# Patient Record
Sex: FEMALE | Race: ASIAN | NOT HISPANIC OR LATINO | URBAN - METROPOLITAN AREA
[De-identification: names, ages, dates, MRNs, and addresses within clinical notes are randomized per-mention and may not be internally consistent; named-entity substitution may affect disease eponyms.]

---

## 2023-11-18 ENCOUNTER — OFFICE VISIT (OUTPATIENT)
Dept: URGENT CARE | Facility: CLINIC | Age: 43
End: 2023-11-18
Payer: COMMERCIAL

## 2023-11-18 VITALS
DIASTOLIC BLOOD PRESSURE: 79 MMHG | HEIGHT: 60 IN | SYSTOLIC BLOOD PRESSURE: 114 MMHG | WEIGHT: 148 LBS | OXYGEN SATURATION: 97 % | HEART RATE: 91 BPM | BODY MASS INDEX: 29.06 KG/M2

## 2023-11-18 DIAGNOSIS — N30.00 ACUTE CYSTITIS WITHOUT HEMATURIA: Primary | ICD-10-CM

## 2023-11-18 DIAGNOSIS — R30.0 DYSURIA: ICD-10-CM

## 2023-11-18 PROBLEM — G43.909 MIGRAINE HEADACHE: Status: ACTIVE | Noted: 2023-11-18

## 2023-11-18 PROBLEM — J32.1 CHRONIC FRONTAL SINUSITIS: Status: ACTIVE | Noted: 2020-10-27

## 2023-11-18 PROBLEM — K21.9 GASTROESOPHAGEAL REFLUX DISEASE: Status: ACTIVE | Noted: 2021-05-14

## 2023-11-18 PROBLEM — N39.0 UTI (URINARY TRACT INFECTION): Status: ACTIVE | Noted: 2023-11-18

## 2023-11-18 PROBLEM — R11.2 NAUSEA AND/OR VOMITING: Status: RESOLVED | Noted: 2023-11-18 | Resolved: 2023-11-18

## 2023-11-18 PROBLEM — M94.0 COSTOCHONDRITIS: Status: RESOLVED | Noted: 2020-10-27 | Resolved: 2023-11-18

## 2023-11-18 LAB
POC APPEARANCE, URINE: ABNORMAL
POC BILIRUBIN, URINE: NEGATIVE
POC BLOOD, URINE: ABNORMAL
POC COLOR, URINE: ABNORMAL
POC GLUCOSE, URINE: NEGATIVE MG/DL
POC KETONES, URINE: NEGATIVE MG/DL
POC LEUKOCYTES, URINE: ABNORMAL
POC NITRITE,URINE: NEGATIVE
POC PH, URINE: 6.5 PH
POC PROTEIN, URINE: ABNORMAL MG/DL
POC SPECIFIC GRAVITY, URINE: 1.01
POC UROBILINOGEN, URINE: 0.2 EU/DL

## 2023-11-18 PROCEDURE — 99213 OFFICE O/P EST LOW 20 MIN: CPT

## 2023-11-18 PROCEDURE — 87186 SC STD MICRODIL/AGAR DIL: CPT

## 2023-11-18 PROCEDURE — 87086 URINE CULTURE/COLONY COUNT: CPT

## 2023-11-18 PROCEDURE — 81003 URINALYSIS AUTO W/O SCOPE: CPT

## 2023-11-18 RX ORDER — NITROFURANTOIN 25; 75 MG/1; MG/1
100 CAPSULE ORAL 2 TIMES DAILY
Qty: 14 CAPSULE | Refills: 0 | Status: SHIPPED | OUTPATIENT
Start: 2023-11-18 | End: 2023-11-25

## 2023-11-18 ASSESSMENT — ENCOUNTER SYMPTOMS
SHORTNESS OF BREATH: 0
HEADACHES: 0
CHILLS: 0
ABDOMINAL PAIN: 1
FREQUENCY: 1
NEUROLOGICAL NEGATIVE: 1
VOMITING: 0
NAUSEA: 0
RESPIRATORY NEGATIVE: 1
COUGH: 0
HEMATURIA: 0
CONSTITUTIONAL NEGATIVE: 1
FEVER: 0
CARDIOVASCULAR NEGATIVE: 1
DIAPHORESIS: 0
PALPITATIONS: 0
DIZZINESS: 0
FLANK PAIN: 0
BACK PAIN: 1
DYSURIA: 1

## 2023-11-18 NOTE — PROGRESS NOTES
Subjective   Patient ID: Malachi Rosenbaum is a 43 y.o. female.      History provided by:  Spouse, medical records and patient  Difficulty Urinating  Pain quality:  Aching  Pain severity:  Severe  Onset quality:  Sudden  Duration:  4 days  Timing:  Constant  Progression:  Worsening  Chronicity:  New  Recent urinary tract infections: no    Relieved by:  Nothing  Ineffective treatments:  None tried  Urinary symptoms: foul-smelling urine and frequent urination    Urinary symptoms: no discolored urine    Associated symptoms: abdominal pain    Associated symptoms: no fever, no flank pain, no nausea and no vomiting        The following portions of the chart were reviewed this encounter and updated as appropriate:  Allergies  Meds  Problems         Review of Systems   Constitutional: Negative.  Negative for chills, diaphoresis and fever.   Respiratory: Negative.  Negative for cough and shortness of breath.    Cardiovascular: Negative.  Negative for chest pain and palpitations.   Gastrointestinal:  Positive for abdominal pain. Negative for nausea and vomiting.   Genitourinary:  Positive for dysuria, frequency, pelvic pain and urgency. Negative for flank pain and hematuria.   Musculoskeletal:  Positive for back pain.   Skin: Negative.  Negative for rash.   Neurological: Negative.  Negative for dizziness and headaches.     Objective   Physical Exam  Vitals and nursing note reviewed.   Constitutional:       General: She is awake. She is not in acute distress.     Appearance: Normal appearance. She is well-developed. She is not ill-appearing.   HENT:      Head: Normocephalic and atraumatic.      Right Ear: External ear normal.      Left Ear: External ear normal.      Nose: Nose normal.      Mouth/Throat:      Mouth: Mucous membranes are moist.   Eyes:      Extraocular Movements: Extraocular movements intact.      Conjunctiva/sclera: Conjunctivae normal.      Pupils: Pupils are equal, round, and reactive to light.   Cardiovascular:       Rate and Rhythm: Normal rate and regular rhythm.   Pulmonary:      Effort: Pulmonary effort is normal. No respiratory distress.      Breath sounds: No stridor.   Abdominal:      General: Abdomen is flat.      Palpations: Abdomen is soft.      Tenderness: There is abdominal tenderness in the suprapubic area. There is no right CVA tenderness or left CVA tenderness.   Musculoskeletal:         General: No swelling or deformity. Normal range of motion.      Cervical back: Normal range of motion and neck supple.   Skin:     General: Skin is warm and dry.      Findings: No rash.   Neurological:      General: No focal deficit present.      Mental Status: She is alert and oriented to person, place, and time. Mental status is at baseline.      Motor: Motor function is intact.      Coordination: Coordination is intact.   Psychiatric:         Mood and Affect: Mood and affect normal.         Speech: Speech normal.         Behavior: Behavior normal.           Assessment/Plan   Diagnoses and all orders for this visit:  Acute cystitis without hematuria  -     nitrofurantoin, macrocrystal-monohydrate, (Macrobid) 100 mg capsule; Take 1 capsule (100 mg) by mouth 2 times a day for 7 days.  Dysuria  -     POCT UA Automated manually resulted  -     Urine culture  -     nitrofurantoin, macrocrystal-monohydrate, (Macrobid) 100 mg capsule; Take 1 capsule (100 mg) by mouth 2 times a day for 7 days.

## 2023-11-21 LAB — BACTERIA UR CULT: ABNORMAL
